# Patient Record
Sex: FEMALE | Race: WHITE | URBAN - METROPOLITAN AREA
[De-identification: names, ages, dates, MRNs, and addresses within clinical notes are randomized per-mention and may not be internally consistent; named-entity substitution may affect disease eponyms.]

---

## 2018-08-15 ENCOUNTER — PROCEDURE VISIT (OUTPATIENT)
Dept: NEUROLOGY | Age: 33
End: 2018-08-15

## 2018-08-15 DIAGNOSIS — G56.22 ENTRAPMENT OF LEFT ULNAR NERVE AT ELBOW: Primary | ICD-10-CM

## 2018-08-15 PROCEDURE — 95886 MUSC TEST DONE W/N TEST COMP: CPT | Performed by: PSYCHIATRY & NEUROLOGY

## 2018-08-15 PROCEDURE — 95909 NRV CNDJ TST 5-6 STUDIES: CPT | Performed by: PSYCHIATRY & NEUROLOGY

## 2018-08-15 NOTE — PROGRESS NOTES
Vinod Bertrand M.D. 800   [facility]  Board Certified in [address], [facility], [facility]    EMG / NERVE CONDUCTION STUDY    PATIENT:     Giana Herr      DATE OF EM/15/2018    YOB: 1985       REASON FOR EMG:  Left arm pain and numbness      [facility] PHYSICIAN[facility]  [facility] [place] [id number]    SUMMARY:  The left median motor and sensory nerve studies were normal.  The left ulnar motor had a mild slowing of conduction velocity across the elbow. The left ulnar and radial sensory nerve studies were normal.  Needle EMG of several muscles in the left upper extremity was normal.     CLINICAL DIAGNOSIS:  Ulnar mononeuropathy     EMG RESULTS:   This patient has a mild left ulnar nerve lesion at the elbow. _____________________________  Vinod Bertrand M.D.   Electromyographer/Neurologist

## 2019-02-11 ENCOUNTER — APPOINTMENT (OUTPATIENT)
Dept: OBGYN | Facility: CLINIC | Age: 34
End: 2019-02-11

## 2019-02-11 PROBLEM — Z00.00 ENCOUNTER FOR PREVENTIVE HEALTH EXAMINATION: Status: ACTIVE | Noted: 2019-02-11

## 2020-08-14 ENCOUNTER — HOSPITAL ENCOUNTER (OUTPATIENT)
Dept: CT IMAGING | Age: 35
Discharge: HOME | End: 2020-08-14
Payer: COMMERCIAL

## 2020-08-14 VITALS
OXYGEN SATURATION: 98 % | HEART RATE: 89 BPM | DIASTOLIC BLOOD PRESSURE: 91 MMHG | RESPIRATION RATE: 16 BRPM | SYSTOLIC BLOOD PRESSURE: 196 MMHG

## 2020-08-14 LAB
APTT: 35.8 SEC (ref 24.2–36.2)
INR BLD: 1.02 (ref 0.86–1.14)
PLATELET # BLD: 174 K/UL (ref 135–450)
PROTHROMBIN TIME: 11.8 SEC (ref 10–13.2)

## 2020-08-14 PROCEDURE — 85730 THROMBOPLASTIN TIME PARTIAL: CPT

## 2020-08-14 PROCEDURE — 36415 COLL VENOUS BLD VENIPUNCTURE: CPT

## 2020-08-14 PROCEDURE — 85610 PROTHROMBIN TIME: CPT

## 2020-08-14 PROCEDURE — C1887 CATHETER, GUIDING: HCPCS

## 2020-08-14 PROCEDURE — 85049 AUTOMATED PLATELET COUNT: CPT

## 2020-08-14 NOTE — PROGRESS NOTES
Pt arrived alert and oriented. C/o 8/10 pain to left arm and wrist. Dr. Micaela Colón reviewed procedure of Left Upper extremity Stellate Ganglion Block  with pt and consent was signed. Vitals stable pt is on RA. Dressing placed Left upper clavicle area. Vitals remained stable. Procedure was completed with local anesthetic.

## 2020-12-21 ENCOUNTER — NON-APPOINTMENT (OUTPATIENT)
Age: 35
End: 2020-12-21

## 2021-01-27 ENCOUNTER — APPOINTMENT (OUTPATIENT)
Dept: CARDIOLOGY | Facility: CLINIC | Age: 36
End: 2021-01-27